# Patient Record
Sex: MALE | Race: WHITE | Employment: UNEMPLOYED | URBAN - METROPOLITAN AREA
[De-identification: names, ages, dates, MRNs, and addresses within clinical notes are randomized per-mention and may not be internally consistent; named-entity substitution may affect disease eponyms.]

---

## 2018-09-16 DIAGNOSIS — H18.519 FUCHS' ENDOTHELIAL DYSTROPHY: ICD-10-CM

## 2018-09-16 DIAGNOSIS — Z98.890 STATUS POST EYE SURGERY: ICD-10-CM

## 2018-09-16 DIAGNOSIS — Z94.7 HX OF CORNEA TRANSPLANT: Primary | ICD-10-CM

## 2018-09-17 RX ORDER — PREDNISOLONE ACETATE 10 MG/ML
1 SUSPENSION/ DROPS OPHTHALMIC
Qty: 1 BOTTLE | Refills: 0 | Status: SHIPPED | OUTPATIENT
Start: 2018-09-17

## 2018-09-17 NOTE — TELEPHONE ENCOUNTER
Medication: prednisoLONE acetate (PRED FORTE) 1 % ophthalmic suspension    Requested directions Place 1 drop Into the left eye every other day  Current directions on the medication list Same Place 1 drop Into the left eye every other day    Last Written Prescription Date:  10/18/16  Last Fill Quantity: 1 bottle,   # refills: 11    Last Office Visit: 10/18/16  Future Office visit: None    Attending Provider: Abelardo Pak MD  Last Clinic Note: Progress Notes      Mr More is a 76 year old male with history of Fuch endothelial dystrophy who presents in follow-up     Today, he says he is doing well. Vision is stable.         Ocular history:  S/p CE/IOL with DMEK OS 11/5/15     Ocular med:  Prednisolone acetate daily OS     A/P:     1. S/p combined phaco/DMEK OS. Doing well.  2. Cataract OD. Pt defers surgery.  3. Fuch's corneal dystrophy OD.     - Cont Predforte , reduce to MWF     - RTC 1 yr or PRN change.               Routing refill request to provider for review/approval because:  1. Inconsistent dose/directions:   Requested/last ordered:  Place 1 drop Into the left eye every other day  Plan last visit:  Predforte , reduce to MWF      2. Appointment past due  Plan last visit 10/18/16: RTC 1 yr or PRN change

## 2018-09-19 NOTE — TELEPHONE ENCOUNTER
Left message with main scheduling number to schedule f/u with Dr. Pak for further refills   Pt last seen in 2016  Alin Rankin RN 3:07 PM 09/19/18

## 2018-10-03 DIAGNOSIS — Z94.7 HX OF CORNEA TRANSPLANT: ICD-10-CM

## 2018-10-03 DIAGNOSIS — H18.519 FUCHS' ENDOTHELIAL DYSTROPHY: ICD-10-CM

## 2018-10-03 DIAGNOSIS — Z98.890 STATUS POST EYE SURGERY: ICD-10-CM

## 2018-10-03 RX ORDER — PREDNISOLONE ACETATE 10 MG/ML
1 SUSPENSION/ DROPS OPHTHALMIC
Qty: 1 BOTTLE | Refills: 0 | OUTPATIENT
Start: 2018-10-03

## 2018-10-11 ENCOUNTER — OFFICE VISIT (OUTPATIENT)
Dept: OPHTHALMOLOGY | Facility: CLINIC | Age: 78
End: 2018-10-11
Attending: OPHTHALMOLOGY
Payer: MEDICARE

## 2018-10-11 DIAGNOSIS — Z94.7 HX OF CORNEA TRANSPLANT: Primary | ICD-10-CM

## 2018-10-11 DIAGNOSIS — Z96.1 PSEUDOPHAKIA: ICD-10-CM

## 2018-10-11 DIAGNOSIS — H18.519 FUCHS' ENDOTHELIAL DYSTROPHY: ICD-10-CM

## 2018-10-11 PROCEDURE — 92015 DETERMINE REFRACTIVE STATE: CPT | Mod: ZF

## 2018-10-11 PROCEDURE — G0463 HOSPITAL OUTPT CLINIC VISIT: HCPCS | Mod: ZF

## 2018-10-11 ASSESSMENT — VISUAL ACUITY
METHOD: SNELLEN - LINEAR
OD_CC: 20/40
OS_CC: 20/30
CORRECTION_TYPE: GLASSES
OD_PH_CC: 20/25

## 2018-10-11 ASSESSMENT — REFRACTION_MANIFEST
OD_ADD: +2.75
OD_CYLINDER: +1.75
OS_ADD: +2.75
OS_AXIS: 175
OS_SPHERE: -1.00
OS_CYLINDER: +2.25
OD_SPHERE: PLANO
OD_AXIS: 015

## 2018-10-11 ASSESSMENT — REFRACTION_WEARINGRX
OD_AXIS: 023
SPECS_TYPE: TRIFOCAL
OD_CYLINDER: +1.25
OD_ADD: +2.75
OD_SPHERE: +0.25
OS_SPHERE: -0.75
OS_AXIS: 175
OS_ADD: +2.75
OS_CYLINDER: +2.50

## 2018-10-11 ASSESSMENT — TONOMETRY
IOP_METHOD: TONOPEN
OS_IOP_MMHG: 15
OD_IOP_MMHG: 15

## 2018-10-11 ASSESSMENT — CONF VISUAL FIELD
OS_NORMAL: 1
OD_NORMAL: 1
METHOD: COUNTING FINGERS

## 2018-10-11 ASSESSMENT — SLIT LAMP EXAM - LIDS
COMMENTS: DERMATOCHALASIS
COMMENTS: DERMATOCHALASIS

## 2018-10-11 ASSESSMENT — CUP TO DISC RATIO
OD_RATIO: 0.75
OS_RATIO: 0.75

## 2018-10-11 ASSESSMENT — EXTERNAL EXAM - LEFT EYE: OS_EXAM: NORMAL

## 2018-10-11 ASSESSMENT — EXTERNAL EXAM - RIGHT EYE: OD_EXAM: NORMAL

## 2018-10-11 NOTE — LETTER
10/11/2018       RE: Scooter More  [address]  [facility] [address]     Dear Colleague,    Thank you for referring your patient, Scooter More, to the EYE CLINIC at [facility]. Please see a copy of my visit note below.    Mr More is a 76 year old male with history of Fuch endothelial dystrophy who presents in follow-up    Today, he says he is doing well. Vision is stable.       Ocular history:  S/p CE/IOL with DMEK OS 11/5/15    Ocular med:  Prednisolone MWF left eye    A/P:    1. S/p combined phaco/DMEK OS. Doing well. Cont prednisolone MWF.    2. Cataract OD. Pt defers surgery.    3. Fuch's corneal dystrophy right eye    4. Dry Eye: ATs as needed    5. Glaucoma Suspect BE: follows with Dr. Resendiz, planned for HVF 11/8/18    6. H/o CME left eye with single MA: MA noted previously. Appears flat on exam. Prev seen by Dr. Gregg. VA stable. Consider OCT Mac.    7. Ref Error: Mrx given    To see Dr. Resendiz for DFE, HVF 11/8/18 - note to her      ~~~~~~~~~~~~~~~~~~~~~~~~~~~~~~~~~~~~~~~~~~~~~~~~~~~~~~~~~~~~~~~~            Again, thank you for allowing me to participate in the care of your patient.      Sincerely,      Abelardo Pak MD, MA  Director, Cornea & Anterior Segment  Director, Fellowship in Cornea & External Disease  [facility] Department of Ophthalmology & Visual Neuroscience  : Safia Jimenez [phone]  Email: [email or web address]  Mobile: [phone]

## 2018-10-11 NOTE — PROGRESS NOTES
Mr More is a 76 year old male with history of Fuch endothelial dystrophy who presents in follow-up    Today, he says he is doing well. Vision is stable.       Ocular history:  S/p CE/IOL with DMEK OS 11/5/15    Ocular med:  Prednisolone MWF left eye    A/P:    1. S/p combined phaco/DMEK OS. Doing well. Cont prednisolone MWF.    2. Cataract OD. Pt defers surgery.    3. Fuch's corneal dystrophy right eye    4. Dry Eye: ATs as needed    5. Glaucoma Suspect BE: follows with Dr. Resendiz, planned for F 11/8/18    6. H/o CME left eye with single MA: MA noted previously. Appears flat on exam. Prev seen by Dr. Gregg. VA stable. Consider OCT Mac.    7. Ref Error: Mrx given    To see Dr. Resendiz for DFE, F 11/8/18 - note to her    Sadaf Weber MD  PGY-5, Cornea Fellow      ~~~~~~~~~~~~~~~~~~~~~~~~~~~~~~~~~~~~~~~~~~~~~~~~~~~~~~~~~~~~~~~~      Complete documentation of historical and exam elements from today's encounter can be found in the full encounter summary report (not reduplicated in this progress note). I personally obtained the chief complaint(s) and history of present illness.  I confirmed and edited as necessary the review of systems, past medical/surgical history, family history, social history, and examination findings as documented by others.  I examined the patient myself, and I personally reviewed the relevant tests, images, and reports as documented above. I formulated and edited as necessary the assessment and plan and discussed the findings and management plan with the patient and family.     Abelardo Pak MD, MA  Director, Cornea & Anterior Segment  [facility] Department of Ophthalmology & Visual Neuroscience

## 2018-10-11 NOTE — MR AVS SNAPSHOT
After Visit Summary   10/11/2018    Scooter More    MRN: [id number]           Patient Information     Date Of Birth          1940        Visit Information        Provider Department      10/11/2018 7:30 AM Abelardo Pak MD [facility]        Today's Diagnoses     Hx of cornea transplant    -  1    Fuchs' endothelial dystrophy        Pseudophakia           Follow-ups after your visit        Who to contact     Please call your clinic at [phone] to:    Ask questions about your health    Make or cancel appointments    Discuss your medicines    Learn about your test results    Speak to your doctor            Additional Information About Your Visit        MyChart Information     [employer] gives you secure access to your electronic health record. If you see a primary care provider, you can also send messages to your care team and make appointments. If you have questions, please call your primary care clinic.  If you do not have a primary care provider, please call [phone] and they will assist you.      [employer] is an electronic gateway that provides easy, online access to your medical records. With [employer], you can request a clinic appointment, read your test results, renew a prescription or communicate with your care team.     To access your existing account, please contact your [facility] or call [phone] for assistance.        Care EveryWhere ID     This is your Care EveryWhere ID. This could be used by other organizations to access your [place] medical records  [id number]         Blood Pressure from Last 3 Encounters:   No data found for BP    Weight from Last 3 Encounters:   No data found for Wt              Today, you had the following     No orders found for display       Primary Care Provider Office Phone # Fax #    Marshall MCDONALD Keyur [phone]       [facility] [address]  [facility] [address]        Equal  Access to Services     [facility]: Hadii marily rogers renetta Au, wadionda luqrakesh, qaprashant katinocelio smith. So [facility] [phone].    ATENCIÓN: Si erickla chelsea, tiene a betancourt disposición servicios gratuitos de asistencia lingüística. Llame al [phone].    We comply with applicable federal civil rights laws and [place] laws. We do not discriminate on the basis of race, color, national origin, age, disability, sex, sexual orientation, or gender identity.            Thank you!     Thank you for choosing EYE CLINIC  for your care. Our goal is always to provide you with excellent care. Hearing back from our patients is one way we can continue to improve our services. Please take a few minutes to complete the written survey that you may receive in the mail after your visit with us. Thank you!             Your Updated Medication List - Protect others around you: Learn how to safely use, store and throw away your medicines at www.disposemymeds.org.          This list is accurate as of 10/11/18 11:59 PM.  Always use your most recent med list.                   Brand Name Dispense Instructions for use Diagnosis    ACETAMINOPHEN PO      Take by mouth as needed        aspirin 81 MG tablet      Take 1 tablet by mouth daily        CLARITIN PO      Take by mouth as needed Uses during pollen season.        LIPITOR PO      Take 40 mg by mouth At Bedtime        prednisoLONE acetate 1 % ophthalmic susp    PRED FORTE    1 Bottle    Place 1 drop Into the left eye three times a week - Monday, Wednesday Friday. *FOR CONTINUED REFILLS PLEASE SCHEDULE A FOLLOW UP APPOINTMENT -[phone]    Status post eye surgery, Hx of cornea transplant, Fuchs' endothelial dystrophy       PROSACEA EX      Externally apply topically as needed        SYNTHROID PO      Take 75 mcg by mouth every morning (before breakfast) 30 mins prior to breakfast

## 2018-10-11 NOTE — NURSING NOTE
Chief Complaints and History of Present Illnesses   Patient presents with     Follow Up For     S/p combined phaco/DMEK OS     HPI    Affected eye(s):  Both   Symptoms:     No blurred vision   No floaters   No flashes   No redness   No tearing      Duration:  2 years   Frequency:  Constant       Do you have eye pain now?:  No      Comments:  Patient presents for 2yr f/u of S/p combined phaco/DMEK left eye. The vision is stable since the last visit. No pain or discomfort, no redness itching or tears. No flashes or floaters. Rare eye drops, taking rx drops TID weekly. Wears glasses BIJU Sosa COT 7:51 AM October 11, 2018

## 2020-03-03 ENCOUNTER — TRANSCRIBE ORDERS (OUTPATIENT)
Dept: OTHER | Age: 80
End: 2020-03-03

## 2020-03-03 DIAGNOSIS — H18.519 FUCHS' CORNEAL DYSTROPHY: Primary | ICD-10-CM

## 2020-03-10 ENCOUNTER — HEALTH MAINTENANCE LETTER (OUTPATIENT)
Age: 80
End: 2020-03-10

## 2020-04-02 ENCOUNTER — TELEPHONE (OUTPATIENT)
Dept: OPHTHALMOLOGY | Facility: CLINIC | Age: 80
End: 2020-04-02

## 2020-04-02 NOTE — TELEPHONE ENCOUNTER
COVID-19 RESCHEDULES     Needed: NO    Date contacted: April 2, 2020 3:49 PM    Type of contact: Spoke with patient     Current appointment date: 4/8/2020    Attending provider: ALICRA YUAN    Current Eye Symptoms: NA    Refills needed: NA    Best contact for rescheduling: ON FILE    Best date/time for rescheduling: DEFER TO AUGUST    COVID19 warnings given about screening and visitors: NO    Mailed letter for NONE contact w/ Patient: KALANI Leach COA COA 3:49 PM April 2, 2020

## 2020-12-27 ENCOUNTER — HEALTH MAINTENANCE LETTER (OUTPATIENT)
Age: 80
End: 2020-12-27

## 2021-04-24 ENCOUNTER — HEALTH MAINTENANCE LETTER (OUTPATIENT)
Age: 81
End: 2021-04-24

## 2021-10-03 ENCOUNTER — HEALTH MAINTENANCE LETTER (OUTPATIENT)
Age: 81
End: 2021-10-03

## 2022-05-15 ENCOUNTER — HEALTH MAINTENANCE LETTER (OUTPATIENT)
Age: 82
End: 2022-05-15

## 2022-09-11 ENCOUNTER — HEALTH MAINTENANCE LETTER (OUTPATIENT)
Age: 82
End: 2022-09-11

## 2023-06-03 ENCOUNTER — HEALTH MAINTENANCE LETTER (OUTPATIENT)
Age: 83
End: 2023-06-03